# Patient Record
Sex: MALE | Race: WHITE | URBAN - METROPOLITAN AREA
[De-identification: names, ages, dates, MRNs, and addresses within clinical notes are randomized per-mention and may not be internally consistent; named-entity substitution may affect disease eponyms.]

---

## 2022-04-19 ENCOUNTER — OFFICE VISIT (OUTPATIENT)
Dept: ORTHOPEDIC SURGERY | Age: 57
End: 2022-04-19
Payer: COMMERCIAL

## 2022-04-19 VITALS — HEIGHT: 71 IN | BODY MASS INDEX: 24.5 KG/M2 | WEIGHT: 175 LBS

## 2022-04-19 DIAGNOSIS — M65.30 TRIGGER FINGER, ACQUIRED: Primary | ICD-10-CM

## 2022-04-19 PROCEDURE — 20550 NJX 1 TENDON SHEATH/LIGAMENT: CPT | Performed by: ORTHOPAEDIC SURGERY

## 2022-04-19 PROCEDURE — 99203 OFFICE O/P NEW LOW 30 MIN: CPT | Performed by: ORTHOPAEDIC SURGERY

## 2022-04-19 RX ORDER — LIDOCAINE HYDROCHLORIDE 10 MG/ML
0.5 INJECTION, SOLUTION INFILTRATION; PERINEURAL ONCE
Status: COMPLETED | OUTPATIENT
Start: 2022-04-19 | End: 2022-04-19

## 2022-04-19 RX ORDER — IBUPROFEN 200 MG
200 TABLET ORAL EVERY 6 HOURS PRN
COMMUNITY

## 2022-04-19 RX ORDER — TRIAMCINOLONE ACETONIDE 40 MG/ML
20 INJECTION, SUSPENSION INTRA-ARTICULAR; INTRAMUSCULAR ONCE
Status: COMPLETED | OUTPATIENT
Start: 2022-04-19 | End: 2022-04-19

## 2022-04-19 RX ADMIN — LIDOCAINE HYDROCHLORIDE 0.5 ML: 10 INJECTION, SOLUTION INFILTRATION; PERINEURAL at 11:42

## 2022-04-19 RX ADMIN — TRIAMCINOLONE ACETONIDE 20 MG: 40 INJECTION, SUSPENSION INTRA-ARTICULAR; INTRAMUSCULAR at 11:42

## 2022-04-19 NOTE — PROGRESS NOTES
This 61 y.o., right hand dominant  is seen in consultation for Carlos Frank MD with a chief complaint of pain, swelling, stiffness and intermittant snapping of the right middle finger. Symptoms have been present for 2 months. The digit is stiff, especially in the morning and will frequently stick in the palm when flexed and pop when extended. This is often associated with pain. Mild swelling has been noticed. The patient denies discoloration or history of injury. Treatment has been prescribed(splinting). The problem has worsened recently. The pain assessment has been reviewed and is correct as stated. .    The patient's social history, past medical history, family history, medications, allergies and review of systems, entered 4/19/22, have been reviewed, and dated and are recorded in the chart. On examination the patient is Height: 5' 10.5\" (179.1 cm) tall and weighs Weight: 175 lb (79.4 kg). Respirations are 18 per minute. The patient is well nourished, is oriented to time and place, demonstrates appropriate mood and affect as well as normal gait and station. There is mild soft tissue swelling of the digit. There is no deformity. There is tenderness, thickening and nodularity at the base of the flexor tendon sheath. Range of motion is slightly limited in flexion and extension. The digit sticks in flexion and pops into extension, accompanied by some pain. Skin is intact without lesions. Distal circulation and sensation are intact. Muscle strength and coordination are normal.  Reflexes are present bilaterally. Joints are stable. There are no subcutaneous nodules or enlarged epitrochlear lymph nodes. I have personally reviewed and interpreted all previous external imaging studies, laboratory tests, diagnostic proceedures and medical encounters pertinent to this patient's visit today. Impression: Right middle finger trigger digit.      The nature of this medical problem is fully discussed with the patient, including all treatment options. All questions are answered. The right  hand is prepared with Betadine and alcohol and the flexor tendon sheath of the right middle finger is injected with 1/2 milliliter of 1% lidocaine and 20 mg.of triamcinalone, with good filling. The patient is advised regarding the expected response and possible reactions from the injection. Acetaminophen or ibuprofen are prescribed for any significant post injection pain. The patient is asked to call me if full, painless function has not returned within 4 weeks. The possibility of recurrence of the problem is discussed.

## 2023-08-07 ENCOUNTER — OFFICE VISIT (OUTPATIENT)
Dept: ORTHOPEDIC SURGERY | Age: 58
End: 2023-08-07

## 2023-08-07 VITALS — BODY MASS INDEX: 25.34 KG/M2 | WEIGHT: 181 LBS | HEIGHT: 71 IN

## 2023-08-07 DIAGNOSIS — M65.30 TRIGGER FINGER, ACQUIRED: Primary | ICD-10-CM

## 2023-08-07 DIAGNOSIS — M72.0 DUPUYTREN'S DISEASE OF PALM OF BOTH HANDS: ICD-10-CM

## 2023-08-07 RX ORDER — ACETAMINOPHEN 325 MG/1
650 TABLET ORAL EVERY 6 HOURS PRN
COMMUNITY

## 2023-08-07 RX ORDER — TRIAMCINOLONE ACETONIDE 40 MG/ML
20 INJECTION, SUSPENSION INTRA-ARTICULAR; INTRAMUSCULAR ONCE
Status: COMPLETED | OUTPATIENT
Start: 2023-08-07 | End: 2023-08-07

## 2023-08-07 RX ORDER — LIDOCAINE HYDROCHLORIDE 10 MG/ML
0.5 INJECTION, SOLUTION INFILTRATION; PERINEURAL ONCE
Status: COMPLETED | OUTPATIENT
Start: 2023-08-07 | End: 2023-08-07

## 2023-08-07 RX ADMIN — TRIAMCINOLONE ACETONIDE 20 MG: 40 INJECTION, SUSPENSION INTRA-ARTICULAR; INTRAMUSCULAR at 16:26

## 2023-08-07 RX ADMIN — LIDOCAINE HYDROCHLORIDE 0.5 ML: 10 INJECTION, SOLUTION INFILTRATION; PERINEURAL at 16:26

## 2023-08-07 NOTE — PROGRESS NOTES
I last examined this patient 1 1/3 year ago at which time I injected the right middle finger for treatment of trigger finger. He obtained prompt and complete relief of all symptoms. Unfortunately, the condition has recurred and the patient returns to the office requesting additional treatment. He complains of pain, swelling, catching and stiffness of the digit for the past 3 months. Symptoms have become worse recently. He asks me about lumps in the palm of his right hand. The patient's social history, past medical history, family history, medications, allergies and review of systems have been reviewed, dated 8/7/23 and are recorded in the chart. I have personally examined and reviewed all previous imaging studies, laboratory tests and medical encounters pertinent to this patient's visit today. On examination there is moderate soft tissue swelling of the right middle finger. There is no deformity. There is tenderness, thickening and nodularity at the base of the flexor tendon sheath. Range of motion is mildly limited in both flexion and extension. The digit does not trigger. Skin is intact without lesions. Distal circulation and sensation are intact. Muscle strength and coordination are normal.  Reflexes and present bilaterally. Joints are stable. There is early Dupuytren's disease in the palms bilaterally, R>L, without contracture. Impression: Recurrent right middle finger trigger digit. Dupuytren's disease of the palms. The nature of these related medical problems is fully discussed with the patient, including all treatment options, as well as the pertinent risks, complications, prognosis and post treatment care. All questions are answered. The right  hand is prepared with Betadine and alcohol and the flexor tendon sheath of the right middle finger is injected with 1/2 milliliter of 1% lidocaine and 20 mg.of triamcinalone, with good filling.   The patient is advised

## 2024-10-23 ENCOUNTER — OFFICE VISIT (OUTPATIENT)
Dept: SURGERY | Age: 59
End: 2024-10-23
Payer: COMMERCIAL

## 2024-10-23 VITALS
TEMPERATURE: 98.2 F | OXYGEN SATURATION: 96 % | WEIGHT: 183.8 LBS | DIASTOLIC BLOOD PRESSURE: 83 MMHG | HEART RATE: 70 BPM | SYSTOLIC BLOOD PRESSURE: 132 MMHG | BODY MASS INDEX: 25.63 KG/M2 | RESPIRATION RATE: 16 BRPM

## 2024-10-23 DIAGNOSIS — K64.2 GRADE III HEMORRHOIDS: Primary | ICD-10-CM

## 2024-10-23 PROCEDURE — 3017F COLORECTAL CA SCREEN DOC REV: CPT | Performed by: SURGERY

## 2024-10-23 PROCEDURE — 1036F TOBACCO NON-USER: CPT | Performed by: SURGERY

## 2024-10-23 PROCEDURE — 99204 OFFICE O/P NEW MOD 45 MIN: CPT | Performed by: SURGERY

## 2024-10-23 PROCEDURE — G8427 DOCREV CUR MEDS BY ELIG CLIN: HCPCS | Performed by: SURGERY

## 2024-10-23 PROCEDURE — G8419 CALC BMI OUT NRM PARAM NOF/U: HCPCS | Performed by: SURGERY

## 2024-10-23 PROCEDURE — G8484 FLU IMMUNIZE NO ADMIN: HCPCS | Performed by: SURGERY

## 2024-10-23 NOTE — PROGRESS NOTES
[facility] PHYSICIANS [place] [facility] COLORECTAL SURGERY  [address] [place] [address]  [address]  [place] [address]  Dept: [phone]  Dept Fax: [phone]  Loc: [phone]    Visit Date: 10/23/2024    Milton Cruz is a 59 y.o. male who presents today for: New Patient (New Patient Referral- Dr. Kerwin Booth- Hemorrhoids. States he as been having symptoms since 2010, bleeding about once a week, some constipation and pain./)      HPI:       Milton Cruz is a 59 y.o. male referred to me for further evaluation guarding possible hemorrhoids.    Milton tells me he has had intermittent symptoms for several months if not years of irritation, discomfort after bowel movements.  His last colonoscopy was in 2010.  He has not had a colonoscopy or evaluation since then.  This was in [place].    He is unsure of family history but there may be history of colon surgery in a second-degree relative    No past medical history on file.    Past Surgical History:   Procedure Laterality Date    TONSILLECTOMY         Cancer-related family history is not on file.    Social History:   Social History     Tobacco Use    Smoking status: Never    Smokeless tobacco: Never   Substance Use Topics    Alcohol use: Never      Tobacco cessation counseling provided as appropriate.    No colonoscopy on file   No cologuard on file  No FIT/FOBT on file   No flexible sigmoidoscopy on file      Objective:     Physical Exam   /83   Pulse 70   Temp 98.2 °F (36.8 °C) (Infrared)   Resp 16   Wt 83.4 kg (183 lb 12.8 oz)   SpO2 96%   BMI 25.63 kg/m²   Constitutional: Appears well-developed and well-nourished. Grooming appropriate. No gross deformities. Body mass index is 25.63 kg/m².    Abdominal/wound: Soft, nontender    Patient refused anorectal exam and instead showed me several pictures on his mobile phone - I was able to visualize on his pictures external hemorrhoids and possibly prolapsing internal

## 2024-10-24 ENCOUNTER — PREP FOR PROCEDURE (OUTPATIENT)
Dept: SURGERY | Age: 59
End: 2024-10-24

## 2024-10-24 ENCOUNTER — TELEPHONE (OUTPATIENT)
Dept: SURGERY | Age: 59
End: 2024-10-24

## 2024-10-24 DIAGNOSIS — Z12.11 SCREENING FOR COLON CANCER: ICD-10-CM

## 2024-10-24 NOTE — TELEPHONE ENCOUNTER
Patient has been scheduled for:    Procedure:   Colonoscopy, possible polypectomy  Date:  1/15/25  Time: 11:30  Arrival: 10:00  Hospital:   [facility]    ASA?:  Prep?   [place]    Pre-op?    Post-op Appt?     Patient advised they will need a .    Case request sent and prep for proc orders done     Medication sent to Pharmacy:     Stents or ostomy marking?    Instructions have been mailed/emailed to:   [email or web address]    Added to outlook calendar

## 2024-11-23 PROBLEM — Z12.11 SCREENING FOR COLON CANCER: Status: RESOLVED | Noted: 2024-10-24 | Resolved: 2024-11-23

## 2025-01-03 PROBLEM — Z12.11 SCREENING FOR COLON CANCER: Status: ACTIVE | Noted: 2024-10-24

## 2025-01-14 ENCOUNTER — TELEPHONE (OUTPATIENT)
Dept: SURGERY | Age: 60
End: 2025-01-14

## 2025-01-14 ENCOUNTER — ANESTHESIA EVENT (OUTPATIENT)
Dept: ENDOSCOPY | Age: 60
End: 2025-01-14
Payer: COMMERCIAL

## 2025-01-14 NOTE — TELEPHONE ENCOUNTER
Lvm for patient on spouse's-Fide, cell to confirm new sx time 8:10, arr time 6:40, (not able to lvm on pt's cell), should have started clear liquid diet, follow prep instructions as listed, NPO after midnight,  has to 18 or over to transport home.

## 2025-01-15 ENCOUNTER — HOSPITAL ENCOUNTER (OUTPATIENT)
Age: 60
Setting detail: OUTPATIENT SURGERY
Discharge: HOME | End: 2025-01-15
Attending: SURGERY | Admitting: SURGERY
Payer: COMMERCIAL

## 2025-01-15 ENCOUNTER — APPOINTMENT (OUTPATIENT)
Dept: ENDOSCOPY | Age: 60
End: 2025-01-15
Attending: SURGERY
Payer: COMMERCIAL

## 2025-01-15 ENCOUNTER — ANESTHESIA (OUTPATIENT)
Dept: ENDOSCOPY | Age: 60
End: 2025-01-15
Payer: COMMERCIAL

## 2025-01-15 VITALS
TEMPERATURE: 97.8 F | WEIGHT: 185 LBS | RESPIRATION RATE: 16 BRPM | HEIGHT: 71 IN | BODY MASS INDEX: 25.9 KG/M2 | HEART RATE: 61 BPM | OXYGEN SATURATION: 100 % | SYSTOLIC BLOOD PRESSURE: 135 MMHG | DIASTOLIC BLOOD PRESSURE: 81 MMHG

## 2025-01-15 PROBLEM — K64.2 GRADE III HEMORRHOIDS: Status: ACTIVE | Noted: 2025-01-15

## 2025-01-15 PROCEDURE — 6370000000 HC RX 637 (ALT 250 FOR IP)

## 2025-01-15 PROCEDURE — 2580000003 HC RX 258: Performed by: NURSE ANESTHETIST, CERTIFIED REGISTERED

## 2025-01-15 PROCEDURE — 45398 COLONOSCOPY W/BAND LIGATION: CPT | Performed by: SURGERY

## 2025-01-15 PROCEDURE — 7100000011 HC PHASE II RECOVERY - ADDTL 15 MIN: Performed by: SURGERY

## 2025-01-15 PROCEDURE — 3700000001 HC ADD 15 MINUTES (ANESTHESIA): Performed by: SURGERY

## 2025-01-15 PROCEDURE — 3700000000 HC ANESTHESIA ATTENDED CARE: Performed by: SURGERY

## 2025-01-15 PROCEDURE — 3609155100 HC COLONOSCOPY W/ BAND LIGATION(S): Performed by: SURGERY

## 2025-01-15 PROCEDURE — 6360000002 HC RX W HCPCS: Performed by: NURSE ANESTHETIST, CERTIFIED REGISTERED

## 2025-01-15 PROCEDURE — 7100000010 HC PHASE II RECOVERY - FIRST 15 MIN: Performed by: SURGERY

## 2025-01-15 RX ORDER — ACETAMINOPHEN 500 MG
TABLET ORAL
Status: COMPLETED
Start: 2025-01-15 | End: 2025-01-15

## 2025-01-15 RX ORDER — SODIUM CHLORIDE, SODIUM LACTATE, POTASSIUM CHLORIDE, CALCIUM CHLORIDE 600; 310; 30; 20 MG/100ML; MG/100ML; MG/100ML; MG/100ML
INJECTION, SOLUTION INTRAVENOUS CONTINUOUS
Status: DISCONTINUED | OUTPATIENT
Start: 2025-01-15 | End: 2025-01-15 | Stop reason: HOSPADM

## 2025-01-15 RX ORDER — ACETAMINOPHEN 500 MG
1000 TABLET ORAL ONCE
Status: COMPLETED | OUTPATIENT
Start: 2025-01-15 | End: 2025-01-15

## 2025-01-15 RX ORDER — ACETAMINOPHEN 500 MG
TABLET ORAL
Status: DISCONTINUED
Start: 2025-01-15 | End: 2025-01-15 | Stop reason: HOSPADM

## 2025-01-15 RX ORDER — LIDOCAINE HYDROCHLORIDE 20 MG/ML
INJECTION, SOLUTION EPIDURAL; INFILTRATION; INTRACAUDAL; PERINEURAL
Status: DISCONTINUED | OUTPATIENT
Start: 2025-01-15 | End: 2025-01-15

## 2025-01-15 RX ORDER — LIDOCAINE HYDROCHLORIDE 20 MG/ML
INJECTION, SOLUTION EPIDURAL; INFILTRATION; INTRACAUDAL; PERINEURAL
Status: DISCONTINUED | OUTPATIENT
Start: 2025-01-15 | End: 2025-01-15 | Stop reason: SDUPTHER

## 2025-01-15 RX ORDER — PROPOFOL 10 MG/ML
INJECTION, EMULSION INTRAVENOUS
Status: DISCONTINUED | OUTPATIENT
Start: 2025-01-15 | End: 2025-01-15 | Stop reason: SDUPTHER

## 2025-01-15 RX ORDER — SODIUM CHLORIDE, SODIUM LACTATE, POTASSIUM CHLORIDE, CALCIUM CHLORIDE 600; 310; 30; 20 MG/100ML; MG/100ML; MG/100ML; MG/100ML
INJECTION, SOLUTION INTRAVENOUS
Status: DISCONTINUED | OUTPATIENT
Start: 2025-01-15 | End: 2025-01-15 | Stop reason: SDUPTHER

## 2025-01-15 RX ADMIN — PROPOFOL 150 MCG/KG/MIN: 10 INJECTION, EMULSION INTRAVENOUS at 11:02

## 2025-01-15 RX ADMIN — PROPOFOL 50 MG: 10 INJECTION, EMULSION INTRAVENOUS at 11:01

## 2025-01-15 RX ADMIN — PROPOFOL 20 MG: 10 INJECTION, EMULSION INTRAVENOUS at 11:03

## 2025-01-15 RX ADMIN — Medication 1000 MG: at 11:52

## 2025-01-15 RX ADMIN — LIDOCAINE HYDROCHLORIDE 60 MG: 20 INJECTION, SOLUTION EPIDURAL; INFILTRATION; INTRACAUDAL; PERINEURAL at 11:01

## 2025-01-15 RX ADMIN — ACETAMINOPHEN 1000 MG: 500 TABLET, FILM COATED ORAL at 11:52

## 2025-01-15 RX ADMIN — SODIUM CHLORIDE, SODIUM LACTATE, POTASSIUM CHLORIDE, AND CALCIUM CHLORIDE: .6; .31; .03; .02 INJECTION, SOLUTION INTRAVENOUS at 10:56

## 2025-01-15 ASSESSMENT — PAIN - FUNCTIONAL ASSESSMENT
PAIN_FUNCTIONAL_ASSESSMENT: ACTIVITIES ARE NOT PREVENTED
PAIN_FUNCTIONAL_ASSESSMENT: 0-10
PAIN_FUNCTIONAL_ASSESSMENT: 0-10

## 2025-01-15 ASSESSMENT — PAIN DESCRIPTION - LOCATION: LOCATION: RECTUM

## 2025-01-15 ASSESSMENT — PAIN SCALES - GENERAL
PAINLEVEL_OUTOF10: 3
PAINLEVEL_OUTOF10: 3

## 2025-01-15 ASSESSMENT — PAIN DESCRIPTION - DESCRIPTORS: DESCRIPTORS: ACHING

## 2025-01-15 NOTE — H&P
PRE-ENDOSCOPY H&P    Visit Date: 1/15/2025    History:     Milton Cruz is a 59 y.o. male who presents today for endoscopy procedure. See A/P below for indications.    Patient Active Problem List   Diagnosis    Trigger finger, acquired    Dupuytren's disease of palm of both hands    Screening for colon cancer     Scheduled Meds:  Continuous Infusions:   lactated ringers       PRN Meds:.  Prior to Admission medications    Medication Sig Start Date End Date Taking? Authorizing Provider   polyethylene glycol (GOLYTELY) 236 g solution Prepare solution according to packaging instructions. 12/31/24  Yes Alfa Kennedy MD   acetaminophen (TYLENOL) 325 MG tablet Take 2 tablets by mouth every 6 hours as needed for Pain   Yes ProviderNeo MD   ibuprofen (ADVIL;MOTRIN) 200 MG tablet Take 1 tablet by mouth every 6 hours as needed for Pain   Yes ProviderNeo MD     No Known Allergies  Past Medical History:   Diagnosis Date    Carpal tunnel syndrome     Hemorrhoids      Past Surgical History:   Procedure Laterality Date    TONSILLECTOMY         Physical Exam:     BP (!) 144/92   Pulse 56   Temp 97.4 °F (36.3 °C) (Temporal)   Resp 14   Ht 1.803 m (5' 11\")   Wt 83.9 kg (185 lb)   SpO2 100%   BMI 25.80 kg/m²  Body mass index is 25.8 kg/m².  Constitutional: Appears well-developed and well-nourished. Grooming appropriate.  Head: Normocephalic, atraumatic.   Eyes: No scleral icterus. Vision intact grossly.  ENT: Hearing grossly intact. No facial deformity.  Cardiovascular: Normal rate on monitor.  Pulmonary/Chest: Effort normal. No respiratory distress. No wheezes. No use of accessory muscles.  Musculoskeletal: Normal range of motion of UE. No gross deformity.   Neurological: Alert and oriented to person, place, and time. No gross deficits.  Psychiatric: Normal mood and affect. Behavior normal. Oriented to person, place, and time.  Abdomen: soft, NTTP, non distended    Recent labs/radiology reviewed as

## 2025-01-15 NOTE — DISCHARGE INSTRUCTIONS
safety:    If polyps or other abnormalities are found, tissue samples will be sent to the pathology lab to be examined. Results are usually mailed to your address on file in 1 to 2 weeks.  Occasionally, we may call you with results. If you do not hear from us, please CALL US and ask for your results: [phone]. Sometimes these results will determine when your next colonoscopy is recommended. Be sure to communicate with your primary care physician about the results of your colonoscopy as well.       Rubber Band Ligation for Hemorrhoids: Before, During, and After Your Procedure    What is rubber band ligation?        Rubber band ligation treats hemorrhoids. Hemorrhoids are swollen veins in the rectal area that can commonly cause bleeding, excess tissue (prolapse), discomfort, and difficulty keeping clean. This treatment is only for internal hemorrhoids.    To do the procedure, your doctor puts a special viewing tool into your anus. This tool is called an anoscope. The doctor then uses a suction tool to grab the hemorrhoid and put a rubber band around it. The band stops the blood flow. This causes the hemorrhoids to shrink and fall off in 2 to 10 days, and purposeful scarring of the tissue back into the rectum.    This procedure is done in the office. Typically 1-3 hemorrhoids are treated at a time during your first visit. The procedure takes about 10 minutes. You can go home when it's done. You can drive yourself if you feel comfortable, as it does not require sedation or anesthesia. Most people are able to return to regular activities right away. Many times the procedure may need to be repeated in the future. The decision to do another banding session depends on your symptoms.    It's very important not to strain when you have a bowel movement before and after your procedure. Continue with your daily fiber supplement (metamucil, benefiber, konsyl, generic brand), healthy fruits and vegetables, plenty of water

## 2025-01-15 NOTE — PROGRESS NOTES
Ambulatory Surgery/Procedure Discharge Note    Vitals:    01/15/25 1125   BP: 115/72   Pulse: 67   Resp: 16   Temp: 97.8 °F (36.6 °C)   SpO2: 96%   Pt meets discharge criteria per Jeremy score.    In: 400 [I.V.:400]  Out: -     Restroom use offered before discharge.  No    Pain assessment:  none  Pain Level: 0    Pt and S.O./family states \"ready to go home\". Pt alert and oriented x4. IV removed. Denies N/V or pain.  Voided prior to discharge. Pt tolerating po intake. Discharge instructions given to pt and wife with pt permission. Pt and wife verbalized understanding of all instructions. Left with all belongings, and discharge instructions.     Patient discharged to home/self care. Patient discharged via wheel chair by transporter to waiting family/S.O.       1/15/2025 11:31 AM

## 2025-01-15 NOTE — ANESTHESIA POSTPROCEDURE EVALUATION
Department of Anesthesiology  Postprocedure Note    Patient: Milton Cruz  MRN: [id number]  YOB: 1965  Date of evaluation: 1/15/2025    Procedure Summary       Date: 01/15/25 Room / Location: [facility] ENDO  / [facility]    Anesthesia Start: 1056 Anesthesia Stop: 1120    Procedure: COLONOSCOPY BAND LIGATION(S) Diagnosis:       Screening for colon cancer      (Screening for colon cancer [Z12.11])    Surgeons: Alfa Kennedy MD Responsible Provider: Kerwin Angel MD    Anesthesia Type: general ASA Status: 2            Anesthesia Type: No value filed.    Jeremy Phase I: Jeremy Score: 10    Jeremy Phase II: Jeremy Score: 10    Anesthesia Post Evaluation    Patient location during evaluation: PACU  Patient participation: complete - patient participated  Level of consciousness: awake and alert  Pain score: 0  Airway patency: patent  Nausea & Vomiting: no nausea and no vomiting  Cardiovascular status: hemodynamically stable  Respiratory status: acceptable  Hydration status: euvolemic  Pain management: adequate    No notable events documented.